# Patient Record
Sex: FEMALE | Race: WHITE | NOT HISPANIC OR LATINO | URBAN - METROPOLITAN AREA
[De-identification: names, ages, dates, MRNs, and addresses within clinical notes are randomized per-mention and may not be internally consistent; named-entity substitution may affect disease eponyms.]

---

## 2025-01-15 ENCOUNTER — PATIENT OUTREACH (OUTPATIENT)
Dept: CARE COORDINATION | Facility: CLINIC | Age: 55
End: 2025-01-15

## 2025-01-15 NOTE — PROGRESS NOTES
First attempt made to reach patient for transitions of care outreach and no contact made with patient. Left voicemail with my name and contact information. Patient needs to make post SNF discharge follow up with POOL Bowman-CNP     Patient discharged from SNF [facility] /[place] with [facility] -Z48.815: Encounter for surgical aftercare following surgery on the digestive system       Thank you,   Jessica Garcia , RN   Nurse Care Manager   [facility] Department   [phone]

## 2025-01-16 ENCOUNTER — PATIENT OUTREACH (OUTPATIENT)
Dept: CARE COORDINATION | Facility: CLINIC | Age: 55
End: 2025-01-16

## 2025-01-16 NOTE — PROGRESS NOTES
Second attempt made to reach patient for transitions of care outreach and no contact made with patient. Left voicemail with my name and contact information. Patient needs to make post SNF discharge follow up with POOL Bowman-CNP      Patient discharged from SNF [facility] /[place] with [facility] -Z48.815: Encounter for surgical aftercare following surgery on the digestive system. Limited discharge summary this care manager can see.         Thank you,   Jessica Garcia , RN   Nurse Care Manager   [facility] Care Department   [phone]

## 2025-01-23 ENCOUNTER — PATIENT OUTREACH (OUTPATIENT)
Dept: CARE COORDINATION | Facility: CLINIC | Age: 55
End: 2025-01-23
Payer: COMMERCIAL

## 2025-03-03 ENCOUNTER — APPOINTMENT (OUTPATIENT)
Dept: SURGERY | Facility: CLINIC | Age: 55
End: 2025-03-03
Payer: COMMERCIAL

## 2025-03-03 DIAGNOSIS — K57.20 DIVERTICULITIS OF LARGE INTESTINE WITH PERFORATION AND ABSCESS WITHOUT BLEEDING: Primary | ICD-10-CM

## 2025-03-03 PROCEDURE — 99024 POSTOP FOLLOW-UP VISIT: CPT | Performed by: SURGERY

## 2025-03-03 PROCEDURE — 1036F TOBACCO NON-USER: CPT | Performed by: SURGERY

## 2025-03-03 NOTE — PROGRESS NOTES
Subjective   Patient ID: Ryann Green is a 54 y.o. female who presents for persistent nausea.  No abdominal pain    HPI the patient had a history of Jerry procedure for perforated diverticulitis in December 23. 2024.  Completely recovered.  Review of Systems GI consistent with abdominal discomfort, nausea, no abdominal pain.  Review of all other 14 system is negative  Physical Exam physical bilaterally, mucosa moist, bilateral breath sounds, regular rate, abdomen soft, nontender, well-healed midline incision.  Ostomy viable.  Soft stool palpable peripheral pulse.  No palpable hernias, no focal neurological motor deficits.  Musculoskeletal exam within normal limits, ENT exam within normal limits    Objective I reviewed all available data including lab results, radiological studies, previous reports and notes.  Spent 45 minutes    No diagnosis found.   There is no problem list on file for this patient.     Not on File   Medication Documentation Review Audit       Reviewed by Nathaniel Loyd MD (Physician) on 03/03/25 at 1619      Medication Order Taking? Sig Documenting Provider Last Dose Status            No Medications to Display                                   History reviewed. No pertinent past medical history.  Social History     Tobacco Use   Smoking Status Not on file   Smokeless Tobacco Not on file     No family history on file.   History reviewed. No pertinent surgical history.    Assessment/Plan   Patient with a persistent nausea after previous Jerry procedure.  Potential abscess development.  Will proceed with CT scan of abdomen pelvis, then follow-up to discuss further management.  Reversal in 3 to 6 months      Nathaniel Loyd MD

## 2025-03-19 ENCOUNTER — OFFICE VISIT (OUTPATIENT)
Dept: SURGERY | Facility: CLINIC | Age: 55
End: 2025-03-19
Payer: COMMERCIAL

## 2025-03-19 ENCOUNTER — APPOINTMENT (OUTPATIENT)
Dept: SURGERY | Facility: CLINIC | Age: 55
End: 2025-03-19
Payer: COMMERCIAL

## 2025-03-19 DIAGNOSIS — K57.20 DIVERTICULITIS OF LARGE INTESTINE WITH PERFORATION AND ABSCESS WITHOUT BLEEDING: Primary | ICD-10-CM

## 2025-03-19 PROCEDURE — 1036F TOBACCO NON-USER: CPT | Performed by: SURGERY

## 2025-03-19 PROCEDURE — 99024 POSTOP FOLLOW-UP VISIT: CPT | Performed by: SURGERY

## 2025-03-19 NOTE — PROGRESS NOTES
Subjective   Patient ID: Ryann Green is a 54 y.o. female who presents for .  Of the appetite after previous Jerry procedure.    HPI she underwent Jerry procedure for perforated diverticulitis, peritonitis.  Recovered very well.  Presented with nausea, intolerance of the food, underwent EGD.  Recently had CT scan to rule out residual abscesses  Review of Systems GI consistent with absence of the appetite.  Review of all other 10 system is negative  Physical Exam pupils equal bilaterally, mucosa moist, bilateral breath sounds, regular rate, abdomen soft, nontender, well-healed incision from midline laparotomy.  Ostomy works well.  No evidence of large parastomal hernia.  Palpable peripheral pulse, no focal neurological motor deficits.    Objective CT scan showed no evidence of residual abscesses.  Ostomy.  EGD showed mild gastritis.  Hiatal hernia.    No diagnosis found.   There is no problem list on file for this patient.     Not on File   Medication Documentation Review Audit       Reviewed by Nathaniel Loyd MD (Physician) on 03/19/25 at 1819      Medication Order Taking? Sig Documenting Provider Last Dose Status            No Medications to Display                                   History reviewed. No pertinent past medical history.  Social History     Tobacco Use   Smoking Status Never   Smokeless Tobacco Never     No family history on file.   History reviewed. No pertinent surgical history.    Assessment/Plan   Status post Jerry procedure.  Patient's abscess of the appetite may be attributed for continues recovery from previous severe peritonitis.  I recommend to adjust diet, considering intolerance of the food.  Follow-up in office in 2 months.  At that time we will schedule surveillance colonoscopy per rectum and per ostomy, then follow-up to schedule reversal      Nathaniel Loyd MD

## 2025-05-07 ENCOUNTER — PATIENT OUTREACH (OUTPATIENT)
Dept: CARE COORDINATION | Facility: CLINIC | Age: 55
End: 2025-05-07
Payer: COMMERCIAL

## 2025-05-07 NOTE — PROGRESS NOTES
Outreach attempt made to reach patient for transitions of care outreach and no contact made with patient. Unable to leave  voicemail with my name and contact information. Not accepting messages.,  Patient was discharged from [facility]

## 2025-05-12 ENCOUNTER — APPOINTMENT (OUTPATIENT)
Dept: SURGERY | Facility: CLINIC | Age: 55
End: 2025-05-12
Payer: COMMERCIAL

## 2025-05-12 DIAGNOSIS — K57.20 DIVERTICULITIS OF LARGE INTESTINE WITH PERFORATION AND ABSCESS WITHOUT BLEEDING: Primary | ICD-10-CM

## 2025-05-12 RX ORDER — METOPROLOL SUCCINATE 50 MG/1
1 TABLET, EXTENDED RELEASE ORAL
COMMUNITY
Start: 2025-04-17

## 2025-05-12 RX ORDER — TALC
POWDER (GRAM) TOPICAL
COMMUNITY
Start: 2025-01-13

## 2025-05-12 RX ORDER — MIRTAZAPINE 7.5 MG/1
TABLET, FILM COATED ORAL
COMMUNITY
Start: 2025-01-08

## 2025-05-12 RX ORDER — POTASSIUM CHLORIDE 750 MG/1
TABLET, EXTENDED RELEASE ORAL EVERY 24 HOURS
COMMUNITY
Start: 2025-01-08

## 2025-05-12 RX ORDER — SPIRONOLACTONE 25 MG/1
1 TABLET ORAL
COMMUNITY
Start: 2025-05-07

## 2025-05-12 RX ORDER — TRAZODONE HYDROCHLORIDE 50 MG/1
TABLET ORAL
COMMUNITY

## 2025-05-12 RX ORDER — ONDANSETRON 4 MG/1
TABLET, FILM COATED ORAL
COMMUNITY

## 2025-05-12 RX ORDER — MAGNESIUM L-LACTATE 84 MG
TABLET, EXTENDED RELEASE ORAL EVERY 24 HOURS
COMMUNITY
Start: 2025-01-02

## 2025-05-12 NOTE — PROGRESS NOTES
Subjective   Patient ID: Ryann Green is a 54 y.o. female who presents for Follow-up (ostomy).  Was diagnosed with perforated diverticulitis.  Subsequently postoperative.  Was complicated by development of pulmonary emboli, cardiomyopathy.  Patient presented for reassessment.  Patient still complains in a poor appetite, multiple intermittent episodes of emesis, unable to keep food down well    HPI described above  Review of Systems GI consistent with a dysphagia, nausea, emesis, review of all other 10 system is negative  Physical Exam pupils equal bilaterally, mucosa moist, bilateral breath sounds, regular rhythm and rate, abdomen is soft, no significant tenderness.  Well-healed midline incision.  Ostomy in place with a soft stool.  Palpable peripheral pulse, no focal neurological motor deficits.  Musculoskeletal exam within normal limits, ENT exam within normal limits    Objective reviewed all available radiological studies since initial admission, multiple consultants notes, the scopic assessment.  Spent more than 80 minutes    No diagnosis found.   Problem List[1]   RX Allergies[2]   Medication Documentation Review Audit       Reviewed by Nathaniel Loyd MD (Physician) on 05/12/25 at 1341      Medication Order Taking? Sig Documenting Provider Last Dose Status   ALBUTEROL INHL [id number] Yes 180 mcg. Historical Provider, MD  Active   magnesium lactate CR (Magtab) 84 mg ER tablet [id number] Yes Take by mouth once every 24 hours. Historical Provider, MD  Active   melatonin 3 mg tablet [id number] Yes Take by mouth. Historical Provider, MD  Active   metoprolol succinate XL (Toprol-XL) 50 mg 24 hr tablet [id number] Yes Take 1 tablet (50 mg) by mouth early in the morning.. Historical Provider, MD  Active   mirtazapine (Remeron) 7.5 mg tablet [id number] Yes Take by mouth. Historical Provider, MD  Active   ondansetron (Zofran) 4 mg tablet [id number]  TAKE 1 TABLET BY MOUTH IF NEEDED FOR NAUSEA. MAY REPEAT DOSE IN 1 HOUR IF  SYMPTOMS PERSIST. MAX 4 TABLETS PER DAY Historical Provider, MD  Active   potassium chloride CR 10 mEq ER tablet [id number] Yes Take by mouth once every 24 hours. Historical Provider, MD  Active   spironolactone (Aldactone) 25 mg tablet [id number] Yes Take 1 tablet (25 mg) by mouth early in the morning.. Historical Provider, MD  Active   traZODone (Desyrel) 50 mg tablet [id number]  TAKE 1/2 TABLET TO 1 TABLET BY MOUTH AT BEDTIME AS NEEDED FOR SLEEP Historical Provider, MD  Active                    Medical History[3]  Tobacco Use History[4]  Family History[5]   Surgical History[6]    Assessment/Plan   Status post Jerry procedure.  Presented for reassessment for potential reversal.  Patient had a history of pulmonary emboli, therefore initially proceed with a further assessment of GI tract with the upper GI and small bowel follow-through.  Patient still have a significant dysphagia and reversal will not improve with.  Previous EGD showed no evidence of peptic ulcer disease.  Follow-up in office after small bowel follow-through and upper GI is done to discuss further management and timing of the surgical intervention      Nathaniel Loyd MD          [1] There is no problem list on file for this patient.  [2] No Known Allergies  [3] History reviewed. No pertinent past medical history.  [4]   Social History  Tobacco Use   Smoking Status Former    Types: Cigarettes   Smokeless Tobacco Never   [5] No family history on file.  [6] History reviewed. No pertinent surgical history.

## 2025-05-14 ENCOUNTER — TELEPHONE (OUTPATIENT)
Dept: SURGERY | Facility: CLINIC | Age: 55
End: 2025-05-14

## 2025-05-14 NOTE — TELEPHONE ENCOUNTER
Per radiology tech, unable to do UGI test. Pt was nauseous.  Will need to reschedule test, but requested for provider to send a Rx for nausea.

## 2025-05-22 ENCOUNTER — APPOINTMENT (OUTPATIENT)
Dept: SURGERY | Facility: CLINIC | Age: 55
End: 2025-05-22

## 2025-06-05 ENCOUNTER — APPOINTMENT (OUTPATIENT)
Dept: SURGERY | Facility: CLINIC | Age: 55
End: 2025-06-05